# Patient Record
Sex: FEMALE | Race: WHITE | NOT HISPANIC OR LATINO | Employment: OTHER | URBAN - NONMETROPOLITAN AREA
[De-identification: names, ages, dates, MRNs, and addresses within clinical notes are randomized per-mention and may not be internally consistent; named-entity substitution may affect disease eponyms.]

---

## 2024-08-13 ENCOUNTER — TELEPHONE (OUTPATIENT)
Age: 34
End: 2024-08-13
Payer: MEDICAID

## 2024-08-13 NOTE — TELEPHONE ENCOUNTER
Called pt to reschedule appt and LVM moving appt to 08/20/24 @ 1:00.  Pt was given number to call if that appt doesn't work for them.

## 2024-08-14 ENCOUNTER — OFFICE VISIT (OUTPATIENT)
Age: 34
End: 2024-08-14
Payer: MEDICAID

## 2024-08-14 VITALS — HEART RATE: 64 BPM | SYSTOLIC BLOOD PRESSURE: 125 MMHG | DIASTOLIC BLOOD PRESSURE: 81 MMHG | TEMPERATURE: 97.2 F

## 2024-08-14 DIAGNOSIS — D23.30 DESMOPLASTIC TRICHOEPITHELIOMA: Primary | ICD-10-CM

## 2024-08-14 PROCEDURE — 99203 OFFICE O/P NEW LOW 30 MIN: CPT | Performed by: OTOLARYNGOLOGY

## 2024-08-14 PROCEDURE — 1160F RVW MEDS BY RX/DR IN RCRD: CPT | Performed by: OTOLARYNGOLOGY

## 2024-08-14 PROCEDURE — 1159F MED LIST DOCD IN RCRD: CPT | Performed by: OTOLARYNGOLOGY

## 2024-08-14 NOTE — PROGRESS NOTES
PRIMARY CARE PROVIDER: Provider, No Known  REFERRING PROVIDER: No ref. provider found    Chief Complaint   Patient presents with    Suspicious Skin Lesion     Lesion to right cheek.  Spot has been there for years.         Subjective   History of Present Illness:  Tova Garcia is a  34 y.o. 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  Prior to the biopsy, the lesion had the following characteristics:    Quality: She thought this was an area of a burn from a curling iron  Severity: Mild  Duration: Years  Timing: constant  Modifying Factors: None  Associated Signs & Symptoms: None    Review of Systems:  Review of Systems   Constitutional:  Negative for chills, fatigue, fever and unexpected weight change.   HENT:  Negative for facial swelling.    Respiratory:  Negative for cough, chest tightness and shortness of breath.    Cardiovascular:  Negative for chest pain.   Musculoskeletal:  Negative for neck pain.   Skin:  Negative for color change.   Neurological:  Negative for facial asymmetry.   Hematological:  Negative for adenopathy. Does not bruise/bleed easily.       Past History:  History reviewed. No pertinent past medical history.  Past Surgical History:   Procedure Laterality Date     SECTION       Family History   Problem Relation Age of Onset    Diabetes Father      Social History     Tobacco Use    Smoking status: Never    Smokeless tobacco: Never   Vaping Use    Vaping status: Every Day   Substance Use Topics    Alcohol use: Yes    Drug use: Never     Allergies:  Patient has no known allergies.  No current outpatient medications on file.    Objective     Vital Signs:  Temp:  [97.2 °F (36.2 °C)] 97.2 °F (36.2 °C)  Heart Rate:  [64] 64  BP: (125)/(81) 125/81    Physical Exam:  Physical Exam  Vitals and nursing note reviewed.   Constitutional:       General: She is not in acute distress.     Appearance: She is  well-developed. She is not diaphoretic.   HENT:      Head: Normocephalic and atraumatic.        Right Ear: External ear normal.      Left Ear: External ear normal.      Nose: Nose normal.   Eyes:      General: No scleral icterus.        Right eye: No discharge.         Left eye: No discharge.      Conjunctiva/sclera: Conjunctivae normal.      Pupils: Pupils are equal, round, and reactive to light.   Neck:      Thyroid: No thyromegaly.      Vascular: No JVD.      Trachea: No tracheal deviation.   Pulmonary:      Effort: Pulmonary effort is normal.      Breath sounds: No stridor.   Musculoskeletal:         General: No deformity. Normal range of motion.      Cervical back: Normal range of motion and neck supple.   Lymphadenopathy:      Cervical: No cervical adenopathy.   Skin:     General: Skin is warm and dry.      Coloration: Skin is not pale.      Findings: No erythema or rash.   Neurological:      Mental Status: She is alert and oriented to person, place, and time.      Cranial Nerves: No cranial nerve deficit.      Coordination: Coordination normal.   Psychiatric:         Speech: Speech normal.         Behavior: Behavior normal. Behavior is cooperative.         Thought Content: Thought content normal.         Judgment: Judgment normal.         Data Review:        Operative plan:            Assessment   1. Desmoplastic trichoepithelioma        Plan     I have offered excision of the desmoplastic trichoepithelioma of the right cheek with  permanent section analysis and likely  linear closure closure in the office under local anesthesia.    Discussion of skin lesion. Discussed risks, benefits, alternatives, and possible complications of excision of the skin lesion with reconstruction utilizing local tissue rearrangement, full-thickness skin grafting, or local interpolated flaps. Risks include, but are not limited too: bleeding, infection, hematoma, recurrence, need for additional procedures, flap failure, cosmetic  deformity. Patient understands risks and would like to proceed with surgery.     My findings and recommendations were discussed and questions were answered.     Jamshid Peters MD  08/14/24  11:55 CDT

## 2024-08-14 NOTE — LETTER
2024     Frida Deluca PA-C  [address] [place] [address]    Patient: Tova Garcia   YOB: 1990   Date of Visit: 2024     Dear Frida Deluca PA-C:       Thank you for referring Tova Garcia to me for evaluation. Below are the relevant portions of my assessment and plan of care.    If you have questions, please do not hesitate to call me. I look forward to following Tova along with you.         Sincerely,        Jamshid Peters MD        CC: No Recipients    Jamshid Peters MD  [id number]  Sign when Signing Visit  PRIMARY CARE PROVIDER: Provider, No Known  REFERRING PROVIDER: No ref. provider found    Chief Complaint   Patient presents with   • Suspicious Skin Lesion     Lesion to right cheek.  Spot has been there for years.         Subjective  History of Present Illness:  Tova Garcia is a  34 y.o. 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  Prior to the biopsy, the lesion had the following characteristics:    Quality: She thought this was an area of a burn from a curling iron  Severity: Mild  Duration: Years  Timing: constant  Modifying Factors: None  Associated Signs & Symptoms: None    Review of Systems:  Review of Systems   Constitutional:  Negative for chills, fatigue, fever and unexpected weight change.   HENT:  Negative for facial swelling.    Respiratory:  Negative for cough, chest tightness and shortness of breath.    Cardiovascular:  Negative for chest pain.   Musculoskeletal:  Negative for neck pain.   Skin:  Negative for color change.   Neurological:  Negative for facial asymmetry.   Hematological:  Negative for adenopathy. Does not bruise/bleed easily.       Past History:  History reviewed. No pertinent past medical history.  Past Surgical History:   Procedure Laterality Date   •  SECTION       Family History   Problem Relation Age of Onset   • Diabetes Father       Social History     Tobacco Use   • Smoking status: Never   • Smokeless tobacco: Never   Vaping Use   • Vaping status: Every Day   Substance Use Topics   • Alcohol use: Yes   • Drug use: Never     Allergies:  Patient has no known allergies.  No current outpatient medications on file.    Objective    Vital Signs:  Temp:  [97.2 °F (36.2 °C)] 97.2 °F (36.2 °C)  Heart Rate:  [64] 64  BP: (125)/(81) 125/81    Physical Exam:  Physical Exam  Vitals and nursing note reviewed.   Constitutional:       General: She is not in acute distress.     Appearance: She is well-developed. She is not diaphoretic.   HENT:      Head: Normocephalic and atraumatic.        Right Ear: External ear normal.      Left Ear: External ear normal.      Nose: Nose normal.   Eyes:      General: No scleral icterus.        Right eye: No discharge.         Left eye: No discharge.      Conjunctiva/sclera: Conjunctivae normal.      Pupils: Pupils are equal, round, and reactive to light.   Neck:      Thyroid: No thyromegaly.      Vascular: No JVD.      Trachea: No tracheal deviation.   Pulmonary:      Effort: Pulmonary effort is normal.      Breath sounds: No stridor.   Musculoskeletal:         General: No deformity. Normal range of motion.      Cervical back: Normal range of motion and neck supple.   Lymphadenopathy:      Cervical: No cervical adenopathy.   Skin:     General: Skin is warm and dry.      Coloration: Skin is not pale.      Findings: No erythema or rash.   Neurological:      Mental Status: She is alert and oriented to person, place, and time.      Cranial Nerves: No cranial nerve deficit.      Coordination: Coordination normal.   Psychiatric:         Speech: Speech normal.         Behavior: Behavior normal. Behavior is cooperative.         Thought Content: Thought content normal.         Judgment: Judgment normal.         Data Review:        Operative plan:            Assessment  1. Desmoplastic trichoepithelioma        Plan    I have  offered excision of the desmoplastic trichoepithelioma of the right cheek with  permanent section analysis and likely  linear closure closure in the office under local anesthesia.    Discussion of skin lesion. Discussed risks, benefits, alternatives, and possible complications of excision of the skin lesion with reconstruction utilizing local tissue rearrangement, full-thickness skin grafting, or local interpolated flaps. Risks include, but are not limited too: bleeding, infection, hematoma, recurrence, need for additional procedures, flap failure, cosmetic deformity. Patient understands risks and would like to proceed with surgery.     My findings and recommendations were discussed and questions were answered.     Jamshid Peters MD  08/14/24  11:55 CDT

## 2024-10-21 ENCOUNTER — PROCEDURE VISIT (OUTPATIENT)
Age: 34
End: 2024-10-21
Payer: MEDICAID

## 2024-10-21 VITALS
SYSTOLIC BLOOD PRESSURE: 144 MMHG | BODY MASS INDEX: 31.39 KG/M2 | HEIGHT: 67 IN | TEMPERATURE: 98 F | DIASTOLIC BLOOD PRESSURE: 76 MMHG | RESPIRATION RATE: 20 BRPM | WEIGHT: 200 LBS | HEART RATE: 70 BPM

## 2024-10-21 DIAGNOSIS — D23.30 DESMOPLASTIC TRICHOEPITHELIOMA: Primary | ICD-10-CM

## 2024-10-21 PROCEDURE — 88342 IMHCHEM/IMCYTCHM 1ST ANTB: CPT | Performed by: OTOLARYNGOLOGY

## 2024-10-21 PROCEDURE — 88305 TISSUE EXAM BY PATHOLOGIST: CPT | Performed by: OTOLARYNGOLOGY

## 2024-10-25 LAB
CYTO UR: NORMAL
LAB AP CASE REPORT: NORMAL
LAB AP CLINICAL INFORMATION: NORMAL
LAB AP SPECIAL STAINS: NORMAL
Lab: NORMAL
PATH REPORT.FINAL DX SPEC: NORMAL
PATH REPORT.GROSS SPEC: NORMAL

## 2024-10-29 ENCOUNTER — OFFICE VISIT (OUTPATIENT)
Age: 34
End: 2024-10-29
Payer: MEDICAID

## 2024-10-29 VITALS — TEMPERATURE: 97.8 F | DIASTOLIC BLOOD PRESSURE: 80 MMHG | SYSTOLIC BLOOD PRESSURE: 130 MMHG | HEART RATE: 69 BPM

## 2024-10-29 DIAGNOSIS — D23.30 DESMOPLASTIC TRICHOEPITHELIOMA: Primary | ICD-10-CM

## 2024-10-29 PROCEDURE — 1159F MED LIST DOCD IN RCRD: CPT | Performed by: NURSE PRACTITIONER

## 2024-10-29 PROCEDURE — 99024 POSTOP FOLLOW-UP VISIT: CPT | Performed by: NURSE PRACTITIONER

## 2024-10-29 PROCEDURE — 1160F RVW MEDS BY RX/DR IN RCRD: CPT | Performed by: NURSE PRACTITIONER

## 2024-10-29 NOTE — PROGRESS NOTES
MAIA Davis  [facility]  [address], [address]  Phone: [phone]  Fax: [phone]     PRIMARY CARE PROVIDER: Provider, No Known  REFERRING PROVIDER: No ref. provider found    Chief Complaint   Patient presents with    Post-op Follow-up     Post op/suture removal - right lateral cheek        Subjective   History of Present Illness:  Tova Garcia is a  34 y.o. female  who presents for follow up s/p excision of a desmoplastic trichoepithelioma of the right cheek with complex closure on 10/21/2024.  Postoperatively, she has been doing very well.  She denies pain, fever, chills, bleeding, or drainage.    Derm: YOANA Selby    Review of Systems:  Review of Systems   Constitutional:  Negative for chills, fatigue, fever and unexpected weight change.   HENT:  Negative for facial swelling.    Respiratory:  Negative for cough, chest tightness and shortness of breath.    Cardiovascular:  Negative for chest pain.   Musculoskeletal:  Negative for neck pain.   Skin:  Negative for color change.   Neurological:  Negative for facial asymmetry.   Hematological:  Negative for adenopathy. Does not bruise/bleed easily.       Past History:  History reviewed. No pertinent past medical history.  Past Surgical History:   Procedure Laterality Date     SECTION       Family History   Problem Relation Age of Onset    Diabetes Father      Social History     Tobacco Use    Smoking status: Never    Smokeless tobacco: Never   Vaping Use    Vaping status: Every Day   Substance Use Topics    Alcohol use: Yes    Drug use: Never     Allergies:  Patient has no known allergies.  No current outpatient medications on file.      Objective     Vital Signs:  Temp:  [97.8 °F (36.6 °C)] 97.8 °F (36.6 °C)  Heart Rate:  [69] 69  BP: (130)/(80) 130/80    Physical Exam:  Physical Exam  Vitals reviewed.   Constitutional:       General: She is not in acute distress.     Appearance: She  is well-developed.   HENT:      Head: Normocephalic and atraumatic.        Right Ear: External ear normal.      Left Ear: External ear normal.      Mouth/Throat:      Lips: Pink.   Eyes:      General: Lids are normal.   Pulmonary:      Effort: Pulmonary effort is normal. No respiratory distress.      Breath sounds: No stridor.   Musculoskeletal:      Cervical back: Neck supple.   Neurological:      Mental Status: She is alert and oriented to person, place, and time.   Psychiatric:         Mood and Affect: Mood normal.         Speech: Speech normal.         Behavior: Behavior normal. Behavior is cooperative.         Results Review:         Assessment   Assessment:  1. Desmoplastic trichoepithelioma        Plan   Plan:  In order to optimize wound healing, it is beneficial to protect the incisions from sunlight for the first year after the procedure.  Sunlight exposure may cause a brown-red discoloration to the incisions, making them more obvious.  Use a sunscreen with titanium dioxide and/or zinc oxide as the active ingredient(s).  Utilize an SPF factor of at least 15.    Use an OTC silicone-based wound cream to the incision daily to optimize wound healing.    I have also offered treatment of the scarring with the 1540 laser.  I have recommended 4-6 treatments 3 to 4 weeks apart.  She was quoted $50 for treatment.  She may begin treatments in 2 weeks if she wishes to proceed.    It is important to follow-up with your primary care provider or dermatologist every 6 to 12 months for routine skin cancer surveillance.    No orders of the defined types were placed in this encounter.    There are no Patient Instructions on file for this visit.  Return in about 3 months (around 1/29/2025), or if symptoms worsen or fail to improve, for Recheck.    My findings and recommendations were discussed and questions were answered.     Flora Kendrick, MAIA  10/29/24  12:17 CDT

## 2025-01-27 ENCOUNTER — OFFICE VISIT (OUTPATIENT)
Age: 35
End: 2025-01-27
Payer: MEDICAID

## 2025-01-27 VITALS — TEMPERATURE: 97.5 F | HEART RATE: 67 BPM | DIASTOLIC BLOOD PRESSURE: 75 MMHG | SYSTOLIC BLOOD PRESSURE: 115 MMHG

## 2025-01-27 DIAGNOSIS — L90.5 SKIN SCARRING/FIBROSIS: ICD-10-CM

## 2025-01-27 DIAGNOSIS — D23.30 DESMOPLASTIC TRICHOEPITHELIOMA: Primary | ICD-10-CM

## 2025-01-27 PROCEDURE — KYSALESTAX: Performed by: NURSE PRACTITIONER

## 2025-01-27 PROCEDURE — 1160F RVW MEDS BY RX/DR IN RCRD: CPT | Performed by: NURSE PRACTITIONER

## 2025-01-27 PROCEDURE — COS199: Performed by: NURSE PRACTITIONER

## 2025-01-27 PROCEDURE — 1159F MED LIST DOCD IN RCRD: CPT | Performed by: NURSE PRACTITIONER

## 2025-01-27 PROCEDURE — 99213 OFFICE O/P EST LOW 20 MIN: CPT | Performed by: NURSE PRACTITIONER

## 2025-01-27 NOTE — PROGRESS NOTES
MAIA Davis  [facility]  [address]   [address], [address]  Phone: [phone]  Fax: [phone]     PRIMARY CARE PROVIDER: Provider, No Known  REFERRING PROVIDER: No ref. provider found    Chief Complaint   Patient presents with    Follow-up     Follow up on exc to right lateral cheek   3 month follow up  Surgery was on 10/21/24       Subjective   History of Present Illness:  Tova Garcia is a  34 y.o. female who presents for follow up s/p excision of a desmoplastic trichoepithelioma of the right cheek with complex closure on 10/21/2024.  Postoperatively, she has been doing very well.  She denies any related complaints.  She denies any new or worrisome skin lesion.    Derm: YOANA Selby    Review of Systems:  Review of Systems   Constitutional:  Negative for chills, fatigue, fever and unexpected weight change.   HENT:  Negative for facial swelling.    Respiratory:  Negative for cough, chest tightness and shortness of breath.    Cardiovascular:  Negative for chest pain.   Musculoskeletal:  Negative for neck pain.   Skin:  Negative for color change.   Neurological:  Negative for facial asymmetry.   Hematological:  Negative for adenopathy. Does not bruise/bleed easily.       Past History:  History reviewed. No pertinent past medical history.  Past Surgical History:   Procedure Laterality Date     SECTION       Family History   Problem Relation Age of Onset    Diabetes Father      Social History     Tobacco Use    Smoking status: Never    Smokeless tobacco: Never   Vaping Use    Vaping status: Every Day   Substance Use Topics    Alcohol use: Yes    Drug use: Never     Allergies:  Patient has no known allergies.  No current outpatient medications on file.      Objective     Vital Signs:  Temp:  [97.5 °F (36.4 °C)] 97.5 °F (36.4 °C)  Heart Rate:  [67] 67  BP: (115)/(75) 115/75    Physical Exam:  Physical Exam  Vitals reviewed.   Constitutional:        General: She is not in acute distress.     Appearance: She is well-developed.   HENT:      Head: Normocephalic and atraumatic.        Right Ear: External ear normal.      Left Ear: External ear normal.      Mouth/Throat:      Lips: Pink.   Eyes:      General: Lids are normal.   Pulmonary:      Effort: Pulmonary effort is normal. No respiratory distress.      Breath sounds: No stridor.   Musculoskeletal:      Cervical back: Neck supple.   Neurological:      Mental Status: She is alert and oriented to person, place, and time.   Psychiatric:         Mood and Affect: Mood normal.         Speech: Speech normal.         Behavior: Behavior normal. Behavior is cooperative.         Results Review:         Assessment   Assessment:  1. Desmoplastic trichoepithelioma    2. Skin scarring/fibrosis          Plan   Plan:  In order to optimize wound healing, it is beneficial to protect the incisions from sunlight for the first year after the procedure.  Sunlight exposure may cause a brown-red discoloration to the incisions, making them more obvious.  Use a sunscreen with titanium dioxide and/or zinc oxide as the active ingredient(s).  Utilize an SPF factor of at least 15.    Use an OTC silicone-based wound cream to the incision daily to optimize wound healing. Massage.    I have also offered treatment of the scarring with the 1540 laser.  I have recommended 4-6 treatments 3 to 4 weeks apart.  She was quoted $50 for treatment.  She would like to begin treatments today.    It is important to follow-up with your primary care provider or dermatologist every 6 to 12 months for routine skin cancer surveillance.    No orders of the defined types were placed in this encounter.    There are no Patient Instructions on file for this visit.  Return in about 4 weeks (around 2/24/2025), or if symptoms worsen or fail to improve, for Recheck.    My findings and recommendations were discussed and questions were answered.     Flora Kendrick,  APRN  01/27/25  12:50 CST

## 2025-01-27 NOTE — PROGRESS NOTES
Preprocedure diagnosis: Scarring    Post procedure diagnosis: Scarring    Procedure performed: 1540 nm xD erbium-doped glass laser treatment of scar of the right cheek (Treatment # 1 of 6 planned)    Provider: MAIA Davis    Anesthesia: None    Details: After a thorough discussion of the potential risks of laser therapy including burning to the skin, recurrence, damage to vision, color changes, patient was taken to the procedure room and verbal consent was obtained      The 1540 xD handpiece was placed and set at 15 ms  50 mJ/mB and used to perform 6 passes, while rotating the handpiece prior to each pulse.  Prior to each pulse, the handpiece was rested on the skin with mild pressure for 3 seconds.    The patient tolerated the procedure well.      $53 was collected prior to procedure.

## 2025-02-24 ENCOUNTER — OFFICE VISIT (OUTPATIENT)
Age: 35
End: 2025-02-24

## 2025-02-24 VITALS
SYSTOLIC BLOOD PRESSURE: 148 MMHG | HEART RATE: 93 BPM | DIASTOLIC BLOOD PRESSURE: 88 MMHG | WEIGHT: 215 LBS | HEIGHT: 67 IN | BODY MASS INDEX: 33.74 KG/M2 | TEMPERATURE: 97.8 F

## 2025-02-24 DIAGNOSIS — L90.5 SKIN SCARRING/FIBROSIS: Primary | ICD-10-CM

## 2025-02-24 PROCEDURE — 11900 INJECT SKIN LESIONS </W 7: CPT | Performed by: NURSE PRACTITIONER

## 2025-02-24 PROCEDURE — KYSALESTAX: Performed by: NURSE PRACTITIONER

## 2025-02-24 PROCEDURE — COS199: Performed by: NURSE PRACTITIONER

## 2025-02-24 NOTE — PROGRESS NOTES
Preoperative Diagnosis: Scarring and fibrosis of the skin of right cheek     Postoperative Diagnosis: Same    Procedure: Steroid injection    Provider: MAIA Davis    Anesthesia: None    Location: [facility] [place] [facility]    Kenalog NDC: 4533-8250-05    Complications: None    Details of Procedure:     I discussed the risks, benefits, alternatives, potential complications of the Kenalog injection which include, but are not limited to: Redness, fat necrosis, neovascularization, blindness if injected near the eye, infection, need for additional injections.    Patient identity was verified and consent was signed. The skin was cleansed with an isopropyl alcohol swab. 6 mg of kenalog in a 50:50 solution of kenalog 40 with 1% lidocaine with 1:100,000 epinephrine was infiltrated into the dermal layer. Pressure was applied to control hemostasis. The patient tolerated the procedure without complication.        MAIA Gould  02/24/25  15:47 CST

## 2025-02-24 NOTE — PROGRESS NOTES
Preprocedure diagnosis: Scarring    Post procedure diagnosis: Scarring    Procedure performed: 1540 nm xD erbium-doped glass laser treatment of scar of the right cheek (Treatment # 2 of 6 planned)    Provider: MAIA Davis    Anesthesia: None    Details: After a thorough discussion of the potential risks of laser therapy including burning to the skin, recurrence, damage to vision, color changes, patient was taken to the procedure room and verbal consent was obtained      The 1540 xD handpiece was placed and set at 15 ms  50 mJ/mB and used to perform 6 passes, while rotating the handpiece prior to each pulse.  Prior to each pulse, the handpiece was rested on the skin with mild pressure for 3 seconds.    The patient tolerated the procedure well.      $53 was collected prior to procedure.